# Patient Record
Sex: MALE | Race: BLACK OR AFRICAN AMERICAN | URBAN - METROPOLITAN AREA
[De-identification: names, ages, dates, MRNs, and addresses within clinical notes are randomized per-mention and may not be internally consistent; named-entity substitution may affect disease eponyms.]

---

## 2017-05-14 ENCOUNTER — EMERGENCY (EMERGENCY)
Facility: HOSPITAL | Age: 52
LOS: 1 days | Discharge: HOME | End: 2017-05-14
Attending: EMERGENCY MEDICINE | Admitting: EMERGENCY MEDICINE
Payer: COMMERCIAL

## 2017-05-14 VITALS
OXYGEN SATURATION: 100 % | RESPIRATION RATE: 16 BRPM | DIASTOLIC BLOOD PRESSURE: 87 MMHG | SYSTOLIC BLOOD PRESSURE: 135 MMHG | TEMPERATURE: 99 F | HEART RATE: 86 BPM

## 2017-05-14 DIAGNOSIS — Z48.00 ENCOUNTER FOR CHANGE OR REMOVAL OF NONSURGICAL WOUND DRESSING: ICD-10-CM

## 2017-05-14 DIAGNOSIS — E11.621 TYPE 2 DIABETES MELLITUS WITH FOOT ULCER: ICD-10-CM

## 2017-05-14 PROCEDURE — 99282 EMERGENCY DEPT VISIT SF MDM: CPT

## 2017-05-14 NOTE — ED PROVIDER NOTE - OBJECTIVE STATEMENT
53 y/o male pt, hx of DM, on insulin here for wound change of R foot. Pt w diabetic ulcer, recently tx w abxs in a medical facility in [place]. Here in [place] traveling for work so was unable to go to a wound care clinic. Denies fever, chills, increased pain, swelling.

## 2017-05-14 NOTE — ED PROVIDER NOTE - PHYSICAL EXAMINATION
R foot ulcer in dorsal aspect of foot w visible muscle and tendon, no foul smell, no suppuration. also ulcer in plantar aspect of foot with visible muscle. pt states this is baseline and better than it was when he was getting tx in [place]

## 2021-08-02 NOTE — ED CLERICAL - EXIT INTERVIEW COMPLETED
14-May-2017 20:57 continue ATC morphine and PRN COVID Vaccine Card on file (Phizer 3/3/21 and 3/21/21)

## 2022-01-16 NOTE — ED ADULT NURSE NOTE - CAS DISCH BELONGINGS RETURNED
39 yr old m that presents with chronic back pain and eye irritation  -labs  -ua  -pain management  -will dc pt with orthopedics, pcp and optho follow up Yes